# Patient Record
Sex: MALE | Race: WHITE | NOT HISPANIC OR LATINO | Employment: OTHER | URBAN - METROPOLITAN AREA
[De-identification: names, ages, dates, MRNs, and addresses within clinical notes are randomized per-mention and may not be internally consistent; named-entity substitution may affect disease eponyms.]

---

## 2018-07-20 ENCOUNTER — TELEPHONE (OUTPATIENT)
Dept: NEUROLOGY | Facility: CLINIC | Age: 68
End: 2018-07-20

## 2018-08-28 ENCOUNTER — OFFICE VISIT (OUTPATIENT)
Dept: NEUROLOGY | Facility: CLINIC | Age: 68
End: 2018-08-28
Payer: MEDICARE

## 2018-08-28 VITALS
HEIGHT: 71 IN | SYSTOLIC BLOOD PRESSURE: 108 MMHG | BODY MASS INDEX: 22.68 KG/M2 | WEIGHT: 162 LBS | DIASTOLIC BLOOD PRESSURE: 64 MMHG

## 2018-08-28 DIAGNOSIS — G20 PARKINSON'S DISEASE (HCC): Primary | ICD-10-CM

## 2018-08-28 PROBLEM — M47.12 OSTEOARTHRITIS OF CERVICAL SPINE WITH MYELOPATHY: Status: ACTIVE | Noted: 2018-08-28

## 2018-08-28 PROBLEM — K50.90 CROHN DISEASE (HCC): Status: ACTIVE | Noted: 2018-08-28

## 2018-08-28 PROBLEM — G20.A1 PARKINSON'S DISEASE (HCC): Status: ACTIVE | Noted: 2018-02-14

## 2018-08-28 PROCEDURE — 99205 OFFICE O/P NEW HI 60 MIN: CPT | Performed by: PSYCHIATRY & NEUROLOGY

## 2018-08-28 RX ORDER — TROSPIUM CHLORIDE ER 60 MG/1
60 CAPSULE ORAL
COMMUNITY

## 2018-08-28 RX ORDER — AMANTADINE HYDROCHLORIDE 100 MG/1
100 TABLET ORAL 2 TIMES DAILY
COMMUNITY
Start: 2018-02-14 | End: 2020-01-24

## 2018-08-28 RX ORDER — SULFASALAZINE 500 MG/1
500 TABLET ORAL DAILY
COMMUNITY

## 2018-08-28 RX ORDER — MERCAPTOPURINE 50 MG/1
50 TABLET ORAL DAILY
COMMUNITY

## 2018-08-28 RX ORDER — RANITIDINE 150 MG/1
150 TABLET ORAL AS NEEDED
COMMUNITY

## 2018-08-28 NOTE — ASSESSMENT & PLAN NOTE
Patient is a 80year-old left handed man who presents to establish care for his Parkinson's disease with symptom onset around 2013 (62yo) with right hand tremor and right shoulder pain  The patient's course is complicated by motor fluctuations with wearing off and possible dyskinesias  He found good tremor reduction with the addition of amantadine but is having some ankle swelling as a side effect       - Will decrease amantadine to 1 tab TID and monitor the ankle swelling   - Consider switching QHS sinemet to CR next time   - Pt with note worthy anxiety and depression (anhedonia ect) will try another SSRI in the future    -Would resume PT for gait and balance once able from a an insurance perspective

## 2018-08-28 NOTE — PATIENT INSTRUCTIONS
Try to decrease the amantadine to three times per day and pay attention to your leg swelling to see if the swelling gets better     @ 8am 11am 3:30    Continue with your sinemet 25/100 5 times per day at 8am, 11, 3:30, 7:30 and at bed time

## 2018-08-28 NOTE — LETTER
August 28, 2018     Faheem Oziel, [address] [place] [address]    Patient: Vera Gonzalez   YOB: 1950   Date of Visit: 8/28/2018       Dear Dr Rick Koyanagi Recipients: Thank you for referring Vera Gonzalez to me for evaluation  Below are my notes for this consultation  If you have questions, please do not hesitate to call me  I look forward to following your patient along with you  Sincerely,        Saul Narvaez MD        CC: No Recipients  Saul Narvaez MD  8/28/2018  5:58 PM  Sign at close encounter  Patient ID: Vera Gonzalez is a 80 y o  male  Assessment/Plan:    Parkinson's disease [facility])  Patient is a 80year-old left handed man who presents to establish care for his Parkinson's disease with symptom onset around 2013 (62yo) with right hand tremor and right shoulder pain  The patient's course is complicated by motor fluctuations with wearing off and possible dyskinesias  He found good tremor reduction with the addition of amantadine but is having some ankle swelling as a side effect  - Will decrease amantadine to 1 tab TID and monitor the ankle swelling   - Consider switching QHS sinemet to CR next time   - Pt with note worthy anxiety and depression (anhedonia ect) will try another SSRI in the future    -Would resume PT for gait and balance once able from a an insurance perspective       There are no diagnoses linked to this encounter  Total time spent today 60 minutes  Greater than 50% of total time was spent with the patient and / or family counseling and / or coordination of care    Subjective:    HPI    I had the pleasure of seeing your patient, Vera Gonzalez in the Movement Disorders Clinic at the [address] [place] [address],[address] for Neuroscience  As you know, Mr Ru Corcoran  is a 80y o  year old left handed male who presents to establish care for his Parkinson's disease  The patient reports that his symptoms began with right hand tremor and right shoulder pain around 2013  He has been evaluated by multiple neurologists in the past including most recently at the movement [address] [facility]  He is still following with a local, general neurologist as well  Previous medication trials:   - Sinemet 1 tab 5 times per day @ 8 11 3:30 7:30 QHS (current)  - Amantadine 1 tab 4 times per day  (ankle swelling) (current)   - Nupro patch: dizzy after one dose   - Mirapex: 2-3 doses caused leg swelling   - Azilect didn't want to try with his crohn's medications    Exposure to neuroleptics, pesticides, toxins, metals: No    Regarding motor symptoms:   Tremor: presenting symptom  Under decent control now  Variable  Slowness: very slow with everything  Stiffness: considerable on the right   Dystonia: right arm "locks" less often now  Changes in gait: slow  Balance issues   Falls: Yes, last week when on vacation  Once per week  PT helped  Freezing: not clear   Trouble with swallowing: no   Clumsiness/dexterity: yes     Regarding non-motor symptoms:   Anosmia: Yes  Constipation: yes  Urinary sx: frequency over night  Lightheadedness: sometimes  Changes in Mood: frustrated  No to much depression or anxiety  Trouble with sleep: poor  REM behavior Disorder: talk in your sleep  Memory trouble: No significant   Hallucinations: no     Regarding medication complication:   Wearing off: "I can tell when its time" lasts about 3 hours  Dyskinesias: Yes  Better with amantadine        Time since last dose: 2hr  Medications and timing:  - Sinemet 1 tab 5 times per day @ 8 11 3:30 7:30 QHS  - Amantadine 1 tab 4 times per day  (ankle swelling)      Objective:    Blood pressure 108/64, height 5' 11" (1 803 m), weight 73 5 kg (162 lb)  Physical Exam    Neurological Exam    GENERAL MEDICAL EXAMINATION:  General appearance: alert, in no apparent distress  Appropriately dressed and groomed  Conversing and interacting appropriately      HEENT: Sclera are non-injected  Mucous membranes are moist    CV: Heart rate is regular  Pulmonary: Breathing comfortably on RA   Musculoskeletal: No evidence of deformities  No contractures  No Edema  Skin: No visible rashes  Warm and well perfused  NEUROLOGICAL EXAMINATION:  Mental Status: Alert and oriented to person place and time  The patient has some difficulty recalling the details of his history, specifically regarding timing of prior medication trials and symptom onset  Attentive: able to name JUAN DAVID backward without difficulty  Language is fluent and appropriate with normal prosody  There were no paraphasic errors  Speech was mildly dysarthric  There was no pallilalia noted  Able to follow both midline and appendicular commands  Cranial Nerves:  II:  Visual fields full to finger counting  III-IV-VI: Full and conjugate, extra-ocular eye movements in all directions of gaze  No nystagmus or diplopia  Normal horizontal and vertical saccades (initiation and velocity)  Intact smooth pursuit  V: Symmetric perception of LT in V1-3  VII:   Some decreased activation of the left face  IX-X: Palate elevates symmetrically  XII: No tongue deviation or fasciculations    Motor: Overall  mildly reduced spontaneous movements  Normal muscle bulk throughout  Mild axial rigidity  Mild hypomimia was noted  Speech was not hypophonic  There were no dyskinesias or dystonia noted  No pronator drift or rebound  No asterixis or myoclonus noted      Rigidity - Upper Extremity (Right)  3   Rigdity - Upper Extremity (Left)   2   Rigidity - Lower Extremity (Right)  3   Rigidity - Lower Extremity (Left)   3   Finger Taps (Right)   3   Finger Taps (Left)   2   Hand Movement (Right)  3   Hand Movement (Left)   3   Pronation/Supination (Right)  2   Pronation/Supination (Left)   2   Toe Tapping (Right) 3   Toe Tapping (Left) 3   Leg Agility (Right)  2   Leg Agility (Left)   2   Postural Tremor (Right) 0   Postural Tremor (Left) 0   Kinetic Tremor (Right)  2cm   Kinetic Tremor (Left)  1cm   Rest tremor amplitude RUE 4cm   Rest tremor amplitude LUE 0   Rest tremor amplitude RLE 2cm   Reset tremor amplitude LLE 0   Lip/Jaw Tremor  0   Consistency of tremor >75%     Strength:    Delt Bi    Tri  WE  FE    FF          C5   C6   C7   C6   C7 C8/T1   R 5    5      5      5      5     5         L 5    5      5      5      5     5              IP   Quad [place]st  TA      L2   L3   L4-S1    L4    R 5     5      5          5     L 5     5     5           5       Reflexes:  2+ and symmetric throughout    Sensory: normal and symmetric perception of light touch and temperature  Coordination: Finger to nose without dysmetria bilaterally  No intention tremor  Gait: Able to rise from a chair without the use of arms - with some trouble  Posture was mild to moderately stooped  Narrow-base and stable stance  Normal initiation  Reduced stride length, step height and arm swing  The patient carries his right arm in a flexed posture  Turn completed in 4 steps  ROS:    Review of Systems   Constitutional: Positive for appetite change and unexpected weight change  Negative for fever  HENT: Positive for voice change  Negative for hearing loss, tinnitus and trouble swallowing  Eyes: Negative  Negative for photophobia and pain  Respiratory: Negative  Negative for shortness of breath  Cardiovascular: Negative  Negative for palpitations  Gastrointestinal: Negative  Negative for nausea and vomiting  Endocrine: Negative  Negative for cold intolerance and heat intolerance  Genitourinary: Positive for frequency and urgency  Negative for dysuria  Musculoskeletal: Negative  Negative for myalgias and neck pain  Skin: Negative  Negative for rash  Neurological: Positive for speech difficulty  Negative for dizziness, tremors, seizures, syncope, facial asymmetry, weakness, light-headedness, numbness and headaches          Clumsiness  Balance problems  Trouble falling asleep  Waking up at night  Facial numbness  Twitching    Hematological: Negative  Does not bruise/bleed easily  Psychiatric/Behavioral: Negative  Negative for confusion, hallucinations and sleep disturbance

## 2018-11-29 ENCOUNTER — OFFICE VISIT (OUTPATIENT)
Dept: NEUROLOGY | Facility: CLINIC | Age: 68
End: 2018-11-29
Payer: MEDICARE

## 2018-11-29 VITALS
SYSTOLIC BLOOD PRESSURE: 128 MMHG | WEIGHT: 165 LBS | DIASTOLIC BLOOD PRESSURE: 76 MMHG | BODY MASS INDEX: 23.1 KG/M2 | HEIGHT: 71 IN

## 2018-11-29 DIAGNOSIS — G20 PARKINSON'S DISEASE (HCC): Primary | ICD-10-CM

## 2018-11-29 PROCEDURE — 99213 OFFICE O/P EST LOW 20 MIN: CPT | Performed by: PSYCHIATRY & NEUROLOGY

## 2018-11-29 RX ORDER — CARBIDOPA AND LEVODOPA 25; 100 MG/1; MG/1
1 TABLET, EXTENDED RELEASE ORAL
Qty: 30 TABLET | Refills: 6 | Status: SHIPPED | OUTPATIENT
Start: 2018-11-29 | End: 2019-07-30 | Stop reason: SDUPTHER

## 2018-11-29 NOTE — ASSESSMENT & PLAN NOTE
The patient is a 21-year-old man with Parkinson's disease, symptom onset in 2013 with right hand tremor now complicated by motor fluctuations with early wearing off and possible dyskinesias  The patient has improved lower extremity swelling with the reduction in his amantadine but it is still bothersome to the patient  We will reduce his dose further to BID      - We will switch QHS Sinemet to CR for morning stiffness   - Reduce amantadine to BID monitor ankle swelling    In the future we could consider adding entacapone, reducing the dosing interval, trying Rytary or zonisamide

## 2018-11-29 NOTE — PROGRESS NOTES
Assessment/Plan:    Parkinson's disease [facility])  The patient is a 41-year-old man with Parkinson's disease, symptom onset in 2013 with right hand tremor now complicated by motor fluctuations with early wearing off and possible dyskinesias  The patient has improved lower extremity swelling with the reduction in his amantadine but it is still bothersome to the patient  We will reduce his dose further to BID  - We will switch QHS Sinemet to CR for morning stiffness   - Reduce amantadine to BID monitor ankle swelling    In the future we could consider adding entacapone, reducing the dosing interval, trying Rytary or zonisamide       Diagnoses and all orders for this visit:    Parkinson's disease ([address] )  -     carbidopa-levodopa (SINEMET CR)  mg TBCR per ER tablet; Take 1 tablet by mouth daily at bedtime        Subjective:     Patient ID: Pema Luna is a 80 y o  male  I had the pleasure of seeing your patient, Pema Luna in the Movement Disorders Clinic at the [facility] for Neuroscience  Pema Luna is a 80year-old left-handed man who presents in follow up for Parkinson's disease with symptom onset around 2013 (62yo) with right hand tremor and right shoulder pain  The patient's course is complicated by motor fluctuations with wearing off and possible dyskinesias  He found good tremor reduction with the addition of amantadine but is having some ankle swelling as a side effect  Swelling improved some with reduction of dose from QID to TID  Since the patient was last here he saw another orthopedic specialist who recommended shoulder replacement but also said he was not a good candidate for surgery, according to the patient  Today the patient reports that his tremor is overall fairly well controlled  It tends to come out when he is nervous  Tremor does emerge about a 0 5 hour before his next dose of medication is due    Since starting the amantadine patient feels that his sleep has been somewhat more disrupted  He also has been having periods of dry mouth  He reports that he is more stiff first thing in the morning  No falls  His balance does feel off occasionally  He is doing physical therapy and speech therapy locally  He continues to follow up with a local neurologist   Again wearing off about 30 minutes prior to his next dose  Previous medication trials:   - Nupro patch: dizzy after one dose   - Mirapex: 2-3 doses caused leg swelling   - Azilect didn't want to try with his crohn's medications    Medications and timing:  - Sinemet 1 tab 5 times per day @ 8 11 3:30 7:30 QHS  - Amantadine 100mg 1 tab 3 times per day  (ankle swelling)    The following portions of the patient's history were reviewed and updated as appropriate: allergies, current medications, past family history, past medical history, past social history, past surgical history and problem list     Objective:      /76 (BP Location: Left arm, Patient Position: Standing, Cuff Size: Standard)   Ht 5' 11" (1 803 m)   Wt 74 8 kg (165 lb)   BMI 23 01 kg/m²     Physical Exam    Neurological Exam  GENERAL MEDICAL EXAMINATION:  General appearance: alert, in no apparent distress  Appropriately dressed and groomed  Conversing and interacting appropriately  Eyes: Sclera are non-injected  Ears, nose, Mouth, Throat: Mucous membranes are moist    Resp: Breathing comfortably on RA   Musculoskeletal: No evidence of deformities  No contractures  No Edema  Skin: No visible rashes  Warm and well perfused    Psych: normal and appropriate affect                                Time since last dose:  4 hours     Speech  2    Facial Expression  3    Rigidity - Neck  1    Rigidity - Upper Extremity (Right)  2    Rigidity - Upper Extremity (Left)   2    Rigidity - Lower Extremity (Right)  2    Rigidity - Lower Extremity (Left)   2    Finger Taps (Right)   3    Finger Taps (Left)   2    Hand Movement (Right)  2    Hand Movement (Left)   2    Pronation/Supination (Right)  3    Pronation/Supination (Left)   2    Toe Tapping (Right) 3    Toe Tapping (Left) 2    Leg Agility (Right)  1    Leg Agility (Left)   2    Arising from Chair   1    Gait   2    Freezing of Gait 0    Postural Stability   x    Posture 3    Global spontaneity of movement 1    Postural Tremor (Right) 1    Postural Tremor (Left) 0    Kinetic Tremor (Right)  1    Kinetic Tremor (Left)  0    Rest tremor amplitude RUE 3    Rest tremor amplitude LUE 2    Rest tremor amplitude RLE 0    Reset tremor amplitude LLE 0    Lip/Jaw Tremor  0    Consistency of tremor 3    Motor Exam Total:        Some mild dystonic posturing of the right arm and leg while walking  Review of Systems   Constitutional: Negative  Negative for appetite change and fever  HENT: Positive for sore throat  Negative for hearing loss, tinnitus, trouble swallowing and voice change  Eyes: Negative  Negative for photophobia and pain  Respiratory: Negative  Negative for shortness of breath  Cardiovascular: Negative  Negative for palpitations  Gastrointestinal: Negative  Negative for nausea and vomiting  Endocrine: Negative  Negative for cold intolerance and heat intolerance  Genitourinary: Negative  Negative for dysuria, frequency and urgency  Musculoskeletal: Positive for gait problem  Negative for myalgias and neck pain  Skin: Negative  Negative for rash  Neurological: Positive for tremors (hands)  Negative for dizziness, seizures, syncope, facial asymmetry, speech difficulty, weakness, light-headedness, numbness and headaches  Hematological: Negative  Does not bruise/bleed easily  Psychiatric/Behavioral: Negative  Negative for confusion, hallucinations and sleep disturbance       Current Outpatient Prescriptions on File Prior to Visit   Medication Sig Dispense Refill    Amantadine HCl 100 MG tablet Take 100 mg by mouth 3 (three) times a day        carbidopa-levodopa (SINEMET)  mg per tablet Take 1 tablet by mouth 5 (five) times a day      mercaptopurine (PURINETHOL) 50 mg tablet Take 50 mg by mouth daily      ranitidine (ZANTAC) 150 mg tablet Take 150 mg by mouth daily      sulfaSALAzine (AZULFIDINE) 500 mg tablet Take 500 mg by mouth daily      trospium (SANCTURA XR) 60 mg 24 hr capsule Take 60 mg by mouth daily before breakfast       No current facility-administered medications on file prior to visit        Gino Gamez MD  Medical Director   [facility] Center  Movement and Memory Specialist

## 2018-11-29 NOTE — LETTER
November 29, 2018     Hina Story MD  [address]  [facility] [address]    Patient: Mandie Lantigua   YOB: 1950   Date of Visit: 11/29/2018       Dear Dr Rose Santana:    Thank you for referring Mandie Lantigua to me for evaluation  Below are my notes for this consultation  If you have questions, please do not hesitate to call me  I look forward to following your patient along with you  Sincerely,        Rubi Lugo MD        CC: DO Rubi Yuan MD  11/29/2018  6:07 PM  Sign at close encounter  Assessment/Plan:    Parkinson's disease [facility])  The patient is a 72-year-old man with Parkinson's disease, symptom onset in 2013 with right hand tremor now complicated by motor fluctuations with early wearing off and possible dyskinesias  The patient has improved lower extremity swelling with the reduction in his amantadine but it is still bothersome to the patient  We will reduce his dose further to BID  - We will switch QHS Sinemet to CR for morning stiffness   - Reduce amantadine to BID monitor ankle swelling    In the future we could consider adding entacapone, reducing the dosing interval, trying Rytary or zonisamide       Diagnoses and all orders for this visit:    Parkinson's disease ([address] )  -     carbidopa-levodopa (SINEMET CR)  mg TBCR per ER tablet; Take 1 tablet by mouth daily at bedtime        Subjective:     Patient ID: Mandie Lantigua is a 80 y o  male  I had the pleasure of seeing your patient, Mandie Lantigua in the Movement Disorders Clinic at the [facility] for Neuroscience  Mandie Lantigua is a 80year-old left-handed man who presents in follow up for Parkinson's disease with symptom onset around 2013 (64yo) with right hand tremor and right shoulder pain  The patient's course is complicated by motor fluctuations with wearing off and possible dyskinesias   He found good tremor reduction with the addition of amantadine but is having some ankle swelling as a side effect  Swelling improved some with reduction of dose from QID to TID  Since the patient was last here he saw another orthopedic specialist who recommended shoulder replacement but also said he was not a good candidate for surgery, according to the patient  Today the patient reports that his tremor is overall fairly well controlled  It tends to come out when he is nervous  Tremor does emerge about a 0 5 hour before his next dose of medication is due  Since starting the amantadine patient feels that his sleep has been somewhat more disrupted  He also has been having periods of dry mouth  He reports that he is more stiff first thing in the morning  No falls  His balance does feel off occasionally  He is doing physical therapy and speech therapy locally  He continues to follow up with a local neurologist   Again wearing off about 30 minutes prior to his next dose  Previous medication trials:   - Nupro patch: dizzy after one dose   - Mirapex: 2-3 doses caused leg swelling   - Azilect didn't want to try with his crohn's medications    Medications and timing:  - Sinemet 1 tab 5 times per day @ 8 11 3:30 7:30 QHS  - Amantadine 100mg 1 tab 3 times per day  (ankle swelling)    The following portions of the patient's history were reviewed and updated as appropriate: allergies, current medications, past family history, past medical history, past social history, past surgical history and problem list     Objective:      /76 (BP Location: Left arm, Patient Position: Standing, Cuff Size: Standard)   Ht 5' 11" (1 803 m)   Wt 74 8 kg (165 lb)   BMI 23 01 kg/m²      Physical Exam    Neurological Exam  GENERAL MEDICAL EXAMINATION:  General appearance: alert, in no apparent distress  Appropriately dressed and groomed  Conversing and interacting appropriately  Eyes: Sclera are non-injected     Ears, nose, Mouth, Throat: Mucous membranes are moist    Resp: Breathing comfortably on RA   Musculoskeletal: No evidence of deformities  No contractures  No Edema  Skin: No visible rashes  Warm and well perfused  Psych: normal and appropriate affect                                Time since last dose:  4 hours     Speech  2    Facial Expression  3    Rigidity - Neck  1    Rigidity - Upper Extremity (Right)  2    Rigidity - Upper Extremity (Left)   2    Rigidity - Lower Extremity (Right)  2    Rigidity - Lower Extremity (Left)   2    Finger Taps (Right)   3    Finger Taps (Left)   2    Hand Movement (Right)  2    Hand Movement (Left)   2    Pronation/Supination (Right)  3    Pronation/Supination (Left)   2    Toe Tapping (Right) 3    Toe Tapping (Left) 2    Leg Agility (Right)  1    Leg Agility (Left)   2    Arising from Chair   1    Gait   2    Freezing of Gait 0    Postural Stability   x    Posture 3    Global spontaneity of movement 1    Postural Tremor (Right) 1    Postural Tremor (Left) 0    Kinetic Tremor (Right)  1    Kinetic Tremor (Left)  0    Rest tremor amplitude RUE 3    Rest tremor amplitude LUE 2    Rest tremor amplitude RLE 0    Reset tremor amplitude LLE 0    Lip/Jaw Tremor  0    Consistency of tremor 3    Motor Exam Total:        Some mild dystonic posturing of the right arm and leg while walking  Review of Systems   Constitutional: Negative  Negative for appetite change and fever  HENT: Positive for sore throat  Negative for hearing loss, tinnitus, trouble swallowing and voice change  Eyes: Negative  Negative for photophobia and pain  Respiratory: Negative  Negative for shortness of breath  Cardiovascular: Negative  Negative for palpitations  Gastrointestinal: Negative  Negative for nausea and vomiting  Endocrine: Negative  Negative for cold intolerance and heat intolerance  Genitourinary: Negative  Negative for dysuria, frequency and urgency  Musculoskeletal: Positive for gait problem  Negative for myalgias and neck pain  Skin: Negative  Negative for rash  Neurological: Positive for tremors (hands)  Negative for dizziness, seizures, syncope, facial asymmetry, speech difficulty, weakness, light-headedness, numbness and headaches  Hematological: Negative  Does not bruise/bleed easily  Psychiatric/Behavioral: Negative  Negative for confusion, hallucinations and sleep disturbance  Current Outpatient Prescriptions on File Prior to Visit   Medication Sig Dispense Refill    Amantadine HCl 100 MG tablet Take 100 mg by mouth 3 (three) times a day        carbidopa-levodopa (SINEMET)  mg per tablet Take 1 tablet by mouth 5 (five) times a day      mercaptopurine (PURINETHOL) 50 mg tablet Take 50 mg by mouth daily      ranitidine (ZANTAC) 150 mg tablet Take 150 mg by mouth daily      sulfaSALAzine (AZULFIDINE) 500 mg tablet Take 500 mg by mouth daily      trospium (SANCTURA XR) 60 mg 24 hr capsule Take 60 mg by mouth daily before breakfast       No current facility-administered medications on file prior to visit        Olivia Tai MD  Medical Director   [facility] Center  Movement and Memory Specialist

## 2018-11-29 NOTE — PATIENT INSTRUCTIONS
Lets try going down on the amantadine to twice per day to see if it makes the swelling any better  At 8 and 3:30  - Sinemet (original) 1 tablet 4 times per day @ 8 11 3:30 7:30  - Sinemet CR (the serena medication) at bedtime     For constipation:  -Get regular exercise  -Drink lots of water  -Every day have at least one serving of fruit with high fiber foods:   Here are some options: applesauce, blueberries, activia yogurt,  bran (like All-Bran or Fiber-one) or prunes with oatmeal, bran or polenta  Consider adding 1-2 tablespoons of ground flax seed as well  - Remember that bananas are constipating  So are refined grains like white rice and white pasta (whole wheat pasta and brown rice ok)  -You can also try a probiotic, which you can find at natural food stores, online, or through foods  Foods with probiotics include yogurt and fermented foods like pickles, sauerkraut, kimchee and miso  There is also a juice called "Good Belly" and a drink called "kefir" with probiotics     - There are medications that can also be helpful:   Docusate sodium (stool softener), 100 mg, 1-3 tabs at night   Miralax once per day

## 2019-01-08 DIAGNOSIS — G20 PARKINSON'S DISEASE (HCC): Primary | ICD-10-CM

## 2019-01-09 NOTE — TELEPHONE ENCOUNTER
Received fax from [address] [place] [address]  asking to clarify if patient is to be taking Sinemet CR and IR  I called and spoke with pharmacist Ahmet Moreno and confirmed patient is to be taking both medications as prescribed  Ahmet Moreno verbalized understanding and states he will update patient record

## 2019-02-08 DIAGNOSIS — G20 PARKINSON'S DISEASE (HCC): ICD-10-CM

## 2019-02-08 NOTE — TELEPHONE ENCOUNTER
Patient called requesting refill for Sinemet  Orders entered, please approve if appropriate  Patient states PCP sent Sinemet CR refill but sent for BID  Patient states PCP had been filling until patient could come to our office  Patient states he notified Chiara Biswas to cancel the prescription from his PCP  I recommended that the patient reach out to PCP and ask that he leave any PD med refills to Dr Carnella Halsted moving forward to prevent any confusion  Patient verbalized clear understanding

## 2019-03-07 ENCOUNTER — OFFICE VISIT (OUTPATIENT)
Dept: NEUROLOGY | Facility: CLINIC | Age: 69
End: 2019-03-07
Payer: MEDICARE

## 2019-03-07 VITALS
HEIGHT: 71 IN | DIASTOLIC BLOOD PRESSURE: 84 MMHG | WEIGHT: 175 LBS | SYSTOLIC BLOOD PRESSURE: 126 MMHG | HEART RATE: 71 BPM | BODY MASS INDEX: 24.5 KG/M2

## 2019-03-07 DIAGNOSIS — R60.0 LOCALIZED EDEMA: ICD-10-CM

## 2019-03-07 DIAGNOSIS — G20 PARKINSON'S DISEASE (HCC): Primary | ICD-10-CM

## 2019-03-07 PROCEDURE — 99214 OFFICE O/P EST MOD 30 MIN: CPT | Performed by: PSYCHIATRY & NEUROLOGY

## 2019-03-07 RX ORDER — ZONISAMIDE 25 MG/1
50 CAPSULE ORAL DAILY
Qty: 60 CAPSULE | Refills: 3 | Status: SHIPPED | OUTPATIENT
Start: 2019-03-07 | End: 2019-09-06 | Stop reason: SDUPTHER

## 2019-03-07 RX ORDER — MELATONIN
1000 DAILY
COMMUNITY

## 2019-03-07 NOTE — ASSESSMENT & PLAN NOTE
61-year-old man with Parkinson's disease complicated by motor fluctuations with wearing off and dyskinesias  The patient's tremor and dyskinesia of become more obvious since the reduction in amantadine  His lower extremity edema is also completely resolved  The patient is still very bothered by his tremor  I am hesitant to go up on his Sinemet given the dyskinesias  We agreed to a trial of zonisamide to focus on the patient's tremor  If this is ineffective we could try acquiring a prior off for long-acting amantadine which may have less lower extremity edema than it is short-acting counterpart

## 2019-03-07 NOTE — PATIENT INSTRUCTIONS
Zonisamide: start with one capsule at bed time, increase to 2 capsules in 2 weeks if everything is going well

## 2019-03-07 NOTE — PROGRESS NOTES
Assessment/Plan:    Parkinson's disease [facility])  50-year-old man with Parkinson's disease complicated by motor fluctuations with wearing off and dyskinesias  The patient's tremor and dyskinesia of become more obvious since the reduction in amantadine  His lower extremity edema is also completely resolved  The patient is still very bothered by his tremor  I am hesitant to go up on his Sinemet given the dyskinesias  We agreed to a trial of zonisamide to focus on the patient's tremor  If this is ineffective we could try acquiring a prior off for long-acting amantadine which may have less lower extremity edema than it is short-acting counterpart  Diagnoses and all orders for this visit:    Parkinson's disease [facility])  -     Ambulatory referral to Physical Therapy; Future  -     zonisamide (ZONEGRAN) 25 mg capsule; Take 2 capsules (50 mg total) by mouth daily Start with one cap at bed time then increase to 2 in two weeks  Localized edema    Other orders  -     Multiple Vitamin (MULTI-VITAMIN) tablet; Take by mouth  -     cholecalciferol (VITAMIN D3) 1,000 units tablet; Take 1,000 Units by mouth daily  -     POTASSIUM PO; Take by mouth        Subjective:     Patient ID: Anupama Yan is a 80 y o  male  I had the pleasure of seeing your patient, Anupama Yan in the Movement Disorders Clinic at the [address] [place] [address],[address] for Neuroscience  Anupama Yan is a 80year-old left-handed man who presents in follow up for Parkinson's disease with symptom onset around 2013 (64yo) with right hand tremor and right shoulder pain  The patient's course is complicated by motor fluctuations with wearing off and dyskinesias; he also has considerable right shoulder pain  He found good tremor reduction with the addition of amantadine but this resulted in significant ankle swelling as a side effect  Swelling improved some with reduction of dose from QID (started by Dr Margarito Maldonado) to TID and resolved when reduced to BID       When he was last here we reduced his amantadine further, to BID due to continued bothersome ankle swelling  We switched his QHS sinemet dose to CR for morning stiffness  Previous medication trials:   - Nupro patch: dizzy after one dose   - Mirapex: 2-3 doses caused leg swelling   - Azilect didn't want to try with his crohn's medications     Current medications and timing:  - Sinemet 25/100; 1 tab; 4 times per day @ 8 11 3:30 7:30   - Sinemet 25/100 CR; 1 tab; QHS  - Amantadine 100mg, 1 tab, BID  (ankle swelling, dry mouth)    Interval history:  Right shoulder is still bothering him a lot  More stiffness, slowness and tremor but ankle swelling is better  Very variable  Some days he reports having almost no symptoms in other days his tremor is quite severe  He is still quite bothered and distressed by the tremor  Regarding motor symptoms:   Tremor: more on the right still  Slowness: worse than prior  Stiffness: worse than prior - since amantadine reduction   Changes in gait: "I think I walk well" issues with his right shoulder  Falls: no        Freezing: yes, mostly if seated for a while  Trouble with swallowing:     Regarding non-motor symptoms:   Lightheadedness: no   Mood: board mostly   Driving issues: wife drives mostly  Regarding medication complications:  Wearing off: 30 min prior to a dose, takes 30 min to kick in     Dyskinesias:  Patient denies this, visible on exam       The following portions of the patient's history were reviewed and updated as appropriate: allergies, current medications, past family history, past medical history, past social history, past surgical history and problem list     Objective:    /84 (BP Location: Left arm, Patient Position: Sitting, Cuff Size: Standard)   Pulse 71   Ht 5' 11" (1 803 m)   Wt 79 4 kg (175 lb)   BMI 24 41 kg/m²       Physical Exam    Neurological Exam    UPDRS motor:                              Time since last dose:       Speech  2 Facial Expression  2     Rigidity - Neck  0     Rigidity - Upper Extremity (Right)  1     Rigidity - Upper Extremity (Left)   1     Rigidity - Lower Extremity (Right)  0     Rigidity - Lower Extremity (Left)   0     Finger Taps (Right)   3     Finger Taps (Left)   2     Hand Movement (Right)  3     Hand Movement (Left)   2     Pronation/Supination (Right)       Pronation/Supination (Left)        Toe Tapping (Right) 3     Toe Tapping (Left) 1     Leg Agility (Right)  2     Leg Agility (Left)   1     Arising from Chair   1     Gait   1     Freezing of Gait 0     Postural Stability        Posture 3     Global spontaneity of movement 1     Postural Tremor (Right) 1     Postural Tremor (Left) 0     Kinetic Tremor (Right)  1     Kinetic Tremor (Left)  0     Rest tremor amplitude RUE 3     Rest tremor amplitude LUE 2     Rest tremor amplitude RLE 0     Reset tremor amplitude LLE 0     Lip/Jaw Tremor  0     Consistency of tremor 2     Motor Exam Total:          Dyskinesia more on the right  Some on the trunk  Some dystonic posturing in the right arm when walking  Review of Systems   Constitutional: Negative  Negative for appetite change and fever  HENT: Negative  Negative for hearing loss, tinnitus, trouble swallowing and voice change  Eyes: Negative  Negative for photophobia and pain  Respiratory: Negative  Negative for shortness of breath  Cardiovascular: Negative  Negative for palpitations  Gastrointestinal: Negative  Negative for nausea and vomiting  Endocrine: Negative  Negative for cold intolerance and heat intolerance  Genitourinary: Negative  Negative for dysuria, frequency and urgency  Musculoskeletal: Negative  Negative for myalgias and neck pain  Feet feel heavy   Skin: Negative  Negative for rash  Neurological: Positive for tremors  Negative for dizziness, seizures, syncope, facial asymmetry, speech difficulty, weakness, light-headedness, numbness and headaches  Hematological: Negative  Does not bruise/bleed easily  Psychiatric/Behavioral: Positive for sleep disturbance  Negative for confusion and hallucinations  The above ROS was reviewed and updated  Eliseo Rae MD  Medical Director   [facility]  Movement and Memory Specialist       Current Outpatient Medications on File Prior to Visit   Medication Sig Dispense Refill    Amantadine HCl 100 MG tablet Take 100 mg by mouth 2 (two) times a day       carbidopa-levodopa (SINEMET CR)  mg TBCR per ER tablet Take 1 tablet by mouth daily at bedtime 30 tablet 6    carbidopa-levodopa (SINEMET)  mg per tablet Take 1 tablet by mouth 4 (four) times a day 360 tablet 0    cholecalciferol (VITAMIN D3) 1,000 units tablet Take 1,000 Units by mouth daily      mercaptopurine (PURINETHOL) 50 mg tablet Take 50 mg by mouth daily      Multiple Vitamin (MULTI-VITAMIN) tablet Take by mouth      POTASSIUM PO Take by mouth      ranitidine (ZANTAC) 150 mg tablet Take 150 mg by mouth daily      sulfaSALAzine (AZULFIDINE) 500 mg tablet Take 500 mg by mouth daily      trospium (SANCTURA XR) 60 mg 24 hr capsule Take 60 mg by mouth daily before breakfast       No current facility-administered medications on file prior to visit

## 2019-03-07 NOTE — LETTER
March 7, 2019     Ebonie Gale, [address] [facility] [place] [id number]    Patient: Ghada Ennis   YOB: 1950   Date of Visit: 3/7/2019       Dear Dr Angelo Keepers: Thank you for referring Ghada Ennis to me for evaluation  Below are my notes for this consultation  If you have questions, please do not hesitate to call me  I look forward to following your patient along with you  Sincerely,        Arpita Ta MD        CC: No Recipients  Arpita Ta MD  3/7/2019  5:54 PM  Sign at close encounter  Assessment/Plan:    Parkinson's disease [facility])  26-year-old man with Parkinson's disease complicated by motor fluctuations with wearing off and dyskinesias  The patient's tremor and dyskinesia of become more obvious since the reduction in amantadine  His lower extremity edema is also completely resolved  The patient is still very bothered by his tremor  I am hesitant to go up on his Sinemet given the dyskinesias  We agreed to a trial of zonisamide to focus on the patient's tremor  If this is ineffective we could try acquiring a prior off for long-acting amantadine which may have less lower extremity edema than it is short-acting counterpart  Diagnoses and all orders for this visit:    Parkinson's disease [facility])  -     Ambulatory referral to Physical Therapy; Future  -     zonisamide (ZONEGRAN) 25 mg capsule; Take 2 capsules (50 mg total) by mouth daily Start with one cap at bed time then increase to 2 in two weeks  Localized edema    Other orders  -     Multiple Vitamin (MULTI-VITAMIN) tablet; Take by mouth  -     cholecalciferol (VITAMIN D3) 1,000 units tablet; Take 1,000 Units by mouth daily  -     POTASSIUM PO; Take by mouth        Subjective:     Patient ID: Ghada Ennis is a 80 y o  male  I had the pleasure of seeing your patient, Ghada Ennis in the Movement Disorders Clinic at the [facility] for Neuroscience        Ghada Ennis is a 80year-old left-handed man who presents in follow up for Parkinson's disease with symptom onset around 2013 (64yo) with right hand tremor and right shoulder pain  The patient's course is complicated by motor fluctuations with wearing off and dyskinesias; he also has considerable right shoulder pain  He found good tremor reduction with the addition of amantadine but this resulted in significant ankle swelling as a side effect  Swelling improved some with reduction of dose from QID (started by Dr Chinmay Vera) to TID and resolved when reduced to BID  When he was last here we reduced his amantadine further, to BID due to continued bothersome ankle swelling  We switched his QHS sinemet dose to CR for morning stiffness  Previous medication trials:   - Nupro patch: dizzy after one dose   - Mirapex: 2-3 doses caused leg swelling   - Azilect didn't want to try with his crohn's medications     Current medications and timing:  - Sinemet 25/100; 1 tab; 4 times per day @ 8 11 3:30 7:30   - Sinemet 25/100 CR; 1 tab; QHS  - Amantadine 100mg, 1 tab, BID  (ankle swelling, dry mouth)    Interval history:  Right shoulder is still bothering him a lot  More stiffness, slowness and tremor but ankle swelling is better  Very variable  Some days he reports having almost no symptoms in other days his tremor is quite severe  He is still quite bothered and distressed by the tremor  Regarding motor symptoms:   Tremor: more on the right still  Slowness: worse than prior  Stiffness: worse than prior - since amantadine reduction   Changes in gait: "I think I walk well" issues with his right shoulder  Falls: no        Freezing: yes, mostly if seated for a while  Trouble with swallowing:     Regarding non-motor symptoms:   Lightheadedness: no   Mood: board mostly   Driving issues: wife drives mostly  Regarding medication complications:  Wearing off: 30 min prior to a dose, takes 30 min to kick in     Dyskinesias:  Patient denies this, visible on exam       The following portions of the patient's history were reviewed and updated as appropriate: allergies, current medications, past family history, past medical history, past social history, past surgical history and problem list     Objective:    /84 (BP Location: Left arm, Patient Position: Sitting, Cuff Size: Standard)   Pulse 71   Ht 5' 11" (1 803 m)   Wt 79 4 kg (175 lb)   BMI 24 41 kg/m²        Physical Exam    Neurological Exam    UPDRS motor:                              Time since last dose:       Speech  2     Facial Expression  2     Rigidity - Neck  0     Rigidity - Upper Extremity (Right)  1     Rigidity - Upper Extremity (Left)   1     Rigidity - Lower Extremity (Right)  0     Rigidity - Lower Extremity (Left)   0     Finger Taps (Right)   3     Finger Taps (Left)   2     Hand Movement (Right)  3     Hand Movement (Left)   2     Pronation/Supination (Right)       Pronation/Supination (Left)        Toe Tapping (Right) 3     Toe Tapping (Left) 1     Leg Agility (Right)  2     Leg Agility (Left)   1     Arising from Chair   1     Gait   1     Freezing of Gait 0     Postural Stability        Posture 3     Global spontaneity of movement 1     Postural Tremor (Right) 1     Postural Tremor (Left) 0     Kinetic Tremor (Right)  1     Kinetic Tremor (Left)  0     Rest tremor amplitude RUE 3     Rest tremor amplitude LUE 2     Rest tremor amplitude RLE 0     Reset tremor amplitude LLE 0     Lip/Jaw Tremor  0     Consistency of tremor 2     Motor Exam Total:          Dyskinesia more on the right  Some on the trunk  Some dystonic posturing in the right arm when walking  Review of Systems   Constitutional: Negative  Negative for appetite change and fever  HENT: Negative  Negative for hearing loss, tinnitus, trouble swallowing and voice change  Eyes: Negative  Negative for photophobia and pain  Respiratory: Negative  Negative for shortness of breath  Cardiovascular: Negative  Negative for palpitations  Gastrointestinal: Negative  Negative for nausea and vomiting  Endocrine: Negative  Negative for cold intolerance and heat intolerance  Genitourinary: Negative  Negative for dysuria, frequency and urgency  Musculoskeletal: Negative  Negative for myalgias and neck pain  Feet feel heavy   Skin: Negative  Negative for rash  Neurological: Positive for tremors  Negative for dizziness, seizures, syncope, facial asymmetry, speech difficulty, weakness, light-headedness, numbness and headaches  Hematological: Negative  Does not bruise/bleed easily  Psychiatric/Behavioral: Positive for sleep disturbance  Negative for confusion and hallucinations  The above ROS was reviewed and updated  Eileen Pride MD  Medical Director   [facility]  Movement and Memory Specialist       Current Outpatient Medications on File Prior to Visit   Medication Sig Dispense Refill    Amantadine HCl 100 MG tablet Take 100 mg by mouth 2 (two) times a day       carbidopa-levodopa (SINEMET CR)  mg TBCR per ER tablet Take 1 tablet by mouth daily at bedtime 30 tablet 6    carbidopa-levodopa (SINEMET)  mg per tablet Take 1 tablet by mouth 4 (four) times a day 360 tablet 0    cholecalciferol (VITAMIN D3) 1,000 units tablet Take 1,000 Units by mouth daily      mercaptopurine (PURINETHOL) 50 mg tablet Take 50 mg by mouth daily      Multiple Vitamin (MULTI-VITAMIN) tablet Take by mouth      POTASSIUM PO Take by mouth      ranitidine (ZANTAC) 150 mg tablet Take 150 mg by mouth daily      sulfaSALAzine (AZULFIDINE) 500 mg tablet Take 500 mg by mouth daily      trospium (SANCTURA XR) 60 mg 24 hr capsule Take 60 mg by mouth daily before breakfast       No current facility-administered medications on file prior to visit

## 2019-03-28 ENCOUNTER — TELEPHONE (OUTPATIENT)
Dept: NEUROLOGY | Facility: CLINIC | Age: 69
End: 2019-03-28

## 2019-03-28 NOTE — TELEPHONE ENCOUNTER
Pt calls in to state that he believes the pharmacy made a mistake and gave him 50mg tabs insead of 25mg of zonisamide  I did verify with Kvng Keen from [address] [place] [address] that he was given 50mg by mistake  Pt has been taking 1 50mg capsule and noticed the mistake before he would have increased to 2 caps  Are you okay with pt continuing to take 1 50mg tab as this was the goal? Please advise       [phone]

## 2019-04-04 NOTE — TELEPHONE ENCOUNTER
Kinza Aguilar from [facility] called to clarify if pt is ok staying on 50 mg  Daron Whalen of the below and verbalized understanding

## 2019-04-15 DIAGNOSIS — G20 PARKINSON'S DISEASE (HCC): ICD-10-CM

## 2019-06-13 ENCOUNTER — OFFICE VISIT (OUTPATIENT)
Dept: NEUROLOGY | Facility: CLINIC | Age: 69
End: 2019-06-13
Payer: MEDICARE

## 2019-06-13 VITALS
HEART RATE: 80 BPM | WEIGHT: 173 LBS | HEIGHT: 71 IN | DIASTOLIC BLOOD PRESSURE: 86 MMHG | BODY MASS INDEX: 24.22 KG/M2 | SYSTOLIC BLOOD PRESSURE: 142 MMHG

## 2019-06-13 DIAGNOSIS — G20 PARKINSON'S DISEASE (HCC): Primary | ICD-10-CM

## 2019-06-13 PROCEDURE — 99214 OFFICE O/P EST MOD 30 MIN: CPT | Performed by: PSYCHIATRY & NEUROLOGY

## 2019-07-30 DIAGNOSIS — G20 PARKINSON'S DISEASE (HCC): ICD-10-CM

## 2019-07-30 RX ORDER — CARBIDOPA AND LEVODOPA 25; 100 MG/1; MG/1
1 TABLET, EXTENDED RELEASE ORAL
Qty: 30 TABLET | Refills: 6 | Status: SHIPPED | OUTPATIENT
Start: 2019-07-30 | End: 2020-02-24 | Stop reason: SDUPTHER

## 2019-09-06 DIAGNOSIS — G20 PARKINSON'S DISEASE (HCC): ICD-10-CM

## 2019-09-06 RX ORDER — ZONISAMIDE 50 MG/1
50 CAPSULE ORAL DAILY
Qty: 30 CAPSULE | Refills: 2 | Status: SHIPPED | OUTPATIENT
Start: 2019-09-06 | End: 2019-09-17 | Stop reason: SDUPTHER

## 2019-09-06 NOTE — TELEPHONE ENCOUNTER
Patient requesting refill of Zonisamide  States he takes 1-50mg capsule at bedtime daily  He reports he is out of medication

## 2019-09-17 ENCOUNTER — OFFICE VISIT (OUTPATIENT)
Dept: NEUROLOGY | Facility: CLINIC | Age: 69
End: 2019-09-17
Payer: MEDICARE

## 2019-09-17 VITALS
BODY MASS INDEX: 23.66 KG/M2 | HEIGHT: 71 IN | HEART RATE: 78 BPM | WEIGHT: 169 LBS | DIASTOLIC BLOOD PRESSURE: 60 MMHG | SYSTOLIC BLOOD PRESSURE: 120 MMHG

## 2019-09-17 DIAGNOSIS — G20 PARKINSON'S DISEASE (HCC): ICD-10-CM

## 2019-09-17 PROCEDURE — 99214 OFFICE O/P EST MOD 30 MIN: CPT | Performed by: PSYCHIATRY & NEUROLOGY

## 2019-09-17 RX ORDER — ZONISAMIDE 100 MG/1
100 CAPSULE ORAL DAILY
Qty: 30 CAPSULE | Refills: 3 | Status: SHIPPED | OUTPATIENT
Start: 2019-09-17 | End: 2020-01-19 | Stop reason: SDUPTHER

## 2019-09-17 NOTE — ASSESSMENT & PLAN NOTE
80-year-old man with Parkinson's disease complicated by a mild motor fluctuations  His symptoms have progressed slightly with reports of increased tremor in his feet  He is hesitant to make any major medication adjustments so we agreed to increase his zonisamide to 100 mg at bedtime, from 50  - We discussed the long-acting amantadine which she is interested in potentially trying in the future    - We could also try Imbrija or entacapone

## 2019-09-17 NOTE — PROGRESS NOTES
Assessment/Plan:    Parkinson's disease [facility])  62-year-old man with Parkinson's disease complicated by a mild motor fluctuations  His symptoms have progressed slightly with reports of increased tremor in his feet  He is hesitant to make any major medication adjustments so we agreed to increase his zonisamide to 100 mg at bedtime, from 50  - We discussed the long-acting amantadine which she is interested in potentially trying in the future  - We could also try Imbrija or entacapone        Diagnoses and all orders for this visit:    Parkinson's disease ([address] )  -     zonisamide (ZONEGRAN) 100 mg capsule; Take 1 capsule (100 mg total) by mouth daily        Subjective:     Patient ID: Carmita Johnson is a 77 y o  male  I had the pleasure of seeing your patient, Carmita Johnson in the Movement Disorders Clinic at the [address] [place] [address],[address] for Neuroscience  Carmita Johnson is a 77year-old left-handed man who presents in follow up for Parkinson's disease with symptom onset around 2013 (62yo) with right hand tremor and right shoulder pain  The patient's course is complicated by motor fluctuations with wearing off and dyskinesias; he also has considerable right shoulder pain  He found good tremor reduction with the addition of amantadine but this resulted in significant ankle swelling as a side effect   Swelling improved some with reduction of dose from QID (started by Dr Deondre Karimi) to TID and resolved when reduced to BID       Current medications and timing:  - Sinemet 25/100; 1 tab; 4 times per day @ 8 11 3:30 7:30   - Sinemet 25/100 CR; 1 tab; QHS  - Amantadine 100mg, 1 tab, BID 8a 3:30p (ankle swelling, dry mouth)  - zonisamide 50mg QHS     Previous medication trials:   - Nupro patch: dizzy after one dose   - Mirapex: 2-3 doses caused leg swelling   - Azilect didn't want to try with his crohn's medications       Interval history:  Speech feels like its getting worse  Some foot tremor now  Tremor overall is better with zonisamide   Feet feel heavy when seated for a while  Gait is no problem  Frequent urination is still an issue   Drool when sleeping wakes him up  Trouble falling asleep due to anxiety  Denies walking trouble  PT was great but his insurance ran out     Driving issues: hasn't been driving   POA:  Not filed       Regarding medication complications:  Wearing off: 30 min early and takes 30 min to kicks in   Dyskinesias: not bothersome       The following portions of the patient's history were reviewed and updated as appropriate: allergies, current medications, past family history, past medical history, past social history, past surgical history and problem list       Objective:  /60 (BP Location: Right arm, Patient Position: Standing, Cuff Size: Standard)   Pulse 78   Ht 5' 11" (1 803 m)   Wt 76 7 kg (169 lb)   BMI 23 57 kg/m²     Physical Exam    Neurological Exam     UPDRS motor:                              Time since last dose:       Speech  2     Facial Expression  2     Rigidity - Neck  0     Rigidity - Upper Extremity (Right)  2     Rigidity - Upper Extremity (Left)   2     Rigidity - Lower Extremity (Right)  1     Rigidity - Lower Extremity (Left)   1     Finger Taps (Right)   2     Finger Taps (Left)   2     Hand Movement (Right)  2     Hand Movement (Left)   2     Pronation/Supination (Right)  2     Pronation/Supination (Left)   2     Toe Tapping (Right) 3     Toe Tapping (Left) 2     Leg Agility (Right)  2     Leg Agility (Left)   2     Arising from Chair   2     Gait   1     Freezing of Gait 0     Postural Stability        Posture 1     Global spontaneity of movement 1     Postural Tremor (Right) 0     Postural Tremor (Left) 0     Kinetic Tremor (Right)  1     Kinetic Tremor (Left)  0     Rest tremor amplitude RUE 3     Rest tremor amplitude LUE 2     Rest tremor amplitude RLE 0     Reset tremor amplitude LLE 0     Lip/Jaw Tremor  2     Consistency of tremor 3     Motor Exam Total: Review of Systems   Constitutional: Negative  Negative for appetite change and fever  HENT: Positive for hearing loss  Negative for tinnitus, trouble swallowing and voice change  Eyes: Negative  Negative for photophobia and pain  Respiratory: Negative  Negative for shortness of breath  Cardiovascular: Negative  Negative for palpitations  Gastrointestinal: Negative  Negative for nausea and vomiting  Endocrine: Negative  Negative for cold intolerance and heat intolerance  Genitourinary: Negative  Negative for dysuria, frequency and urgency  Musculoskeletal: Positive for gait problem  Negative for myalgias and neck pain  Shoulder pain in the right arm   Skin: Negative  Negative for rash  Neurological: Positive for tremors (after taking meds but getting better) and speech difficulty (slurred)  Negative for dizziness, seizures, syncope, facial asymmetry, weakness, light-headedness, numbness and headaches  Twitching in the feet   Hematological: Negative  Does not bruise/bleed easily  Psychiatric/Behavioral: Positive for sleep disturbance (drooling)  Negative for confusion and hallucinations  The patient is nervous/anxious  The above ROS was reviewed and updated       Hakan Kemp MD  Medical Director   [facility]  Movement and Memory Specialist       Current Outpatient Medications on File Prior to Visit   Medication Sig Dispense Refill    Amantadine HCl 100 MG tablet Take 100 mg by mouth 2 (two) times a day       carbidopa-levodopa (SINEMET CR)  mg TBCR per ER tablet Take 1 tablet by mouth daily at bedtime 30 tablet 6    carbidopa-levodopa (SINEMET)  mg per tablet Take 1 tablet by mouth 4 (four) times a day 360 tablet 5    mercaptopurine (PURINETHOL) 50 mg tablet Take 50 mg by mouth daily      Multiple Vitamin (MULTI-VITAMIN) tablet Take by mouth      ranitidine (ZANTAC) 150 mg tablet Take 150 mg by mouth as needed       sulfaSALAzine (AZULFIDINE) 500 mg tablet Take 500 mg by mouth daily 2 tabs 3 times daily      trospium (SANCTURA XR) 60 mg 24 hr capsule Take 60 mg by mouth daily before breakfast      [DISCONTINUED] zonisamide (ZONEGRAN) 50 MG capsule Take 1 capsule (50 mg total) by mouth daily 30 capsule 2    cholecalciferol (VITAMIN D3) 1,000 units tablet Take 1,000 Units by mouth daily      POTASSIUM PO Take by mouth       No current facility-administered medications on file prior to visit

## 2019-11-07 ENCOUNTER — TELEPHONE (OUTPATIENT)
Dept: NEUROLOGY | Facility: CLINIC | Age: 69
End: 2019-11-07

## 2019-11-07 NOTE — TELEPHONE ENCOUNTER
[facility] for patient to return my call about medication refill  Gave my direct line in [place]

## 2020-01-17 DIAGNOSIS — G20 PARKINSON'S DISEASE (HCC): Primary | ICD-10-CM

## 2020-01-17 NOTE — TELEPHONE ENCOUNTER
Medication refill check list    Correct patient? yes   Correct medication name, dose, and pill size? yes   Correct provider? yes   Last and Next appt  scheduled? Yes, last date 9/17/19 & next date 1/24/20   Right pharmacy listed? yes   Correct quantity for 30 or 90 days? yes   Is the patient out of refills? When was it last prescribed? Yes, last date 9/17/19   Directions match what the patient says they are taking?  yes   Enough refills? (none for controlled substances, 1 year for routine medications) no

## 2020-01-19 RX ORDER — ZONISAMIDE 100 MG/1
100 CAPSULE ORAL DAILY
Qty: 30 CAPSULE | Refills: 3 | Status: SHIPPED | OUTPATIENT
Start: 2020-01-19 | End: 2020-04-21 | Stop reason: SDUPTHER

## 2020-01-24 ENCOUNTER — OFFICE VISIT (OUTPATIENT)
Dept: NEUROLOGY | Facility: CLINIC | Age: 70
End: 2020-01-24
Payer: MEDICARE

## 2020-01-24 VITALS
WEIGHT: 170 LBS | DIASTOLIC BLOOD PRESSURE: 84 MMHG | HEIGHT: 71 IN | HEART RATE: 79 BPM | SYSTOLIC BLOOD PRESSURE: 122 MMHG | BODY MASS INDEX: 23.8 KG/M2

## 2020-01-24 DIAGNOSIS — G20 PARKINSON'S DISEASE (HCC): Primary | ICD-10-CM

## 2020-01-24 PROCEDURE — 99214 OFFICE O/P EST MOD 30 MIN: CPT | Performed by: PSYCHIATRY & NEUROLOGY

## 2020-01-24 RX ORDER — AMANTADINE HYDROCHLORIDE 100 MG/1
100 CAPSULE, GELATIN COATED ORAL 2 TIMES DAILY
Qty: 180 CAPSULE | Refills: 3 | Status: SHIPPED | OUTPATIENT
Start: 2020-01-24 | End: 2020-11-30

## 2020-01-24 NOTE — PATIENT INSTRUCTIONS
Parkinson's Disease Resources     Helpful web sites   -  www ChangePanda  org   -  www parkinson  org (the [employer])   -  www [employer] ([address] for [place]) - Order the "Every Victory Counts" cheyanne under the "resources" tab  Or visit [employer] or call 122-202-7714   - Contact the [facility] for an "Aware in care kit" @ 7514.354.2494 (this is a kit to take with you if you ever have to go to the hospital)    - check out "The [address] [facility]" for help paying for your medications: www tafcares  org

## 2020-01-24 NOTE — PROGRESS NOTES
Assessment/Plan:    Parkinson's disease ([facility] )  77year-old man with Parksinson's disease presents in followup  Increase in zonisamide resulted in resolution of his LE tremors  Overall he is stable from prior  We discussed different options regarding medication management and agreed not to make any changes at this point  In the future we can try increasing his sinemet dose, long-acting amantadine, Nourianz,       Diagnoses and all orders for this visit:    Parkinson's disease ([facility] )  -     amantadine (SYMMETREL) 100 mg capsule; Take 1 capsule (100 mg total) by mouth 2 (two) times a day  -     Ambulatory referral to Physical Therapy; Future        Subjective:     Patient ID: Mindi Mak is a 77 y o  male  I had the pleasure of seeing your patient, Mindi Mak in the Movement Disorders Clinic at the [facility] for Neuroscience  Mindi Mak is a 77year-old left-handed man who presents in follow up for Parkinson's disease with symptom onset around 2013 (62yo) with right hand tremor and right shoulder pain  The patient's course is complicated by motor fluctuations with wearing off and dyskinesias; he also has considerable right shoulder pain  He found good tremor reduction with the addition of amantadine but this resulted in significant ankle swelling as a side effect  Swelling improved some with reduction of dose from QID (started by Dr Jose Jin) to TID and resolved when reduced to BID       Current medications and timing:  - Sinemet 25/100; 1 tab; 4 times per day @ 8 11 3:30 7:30   - Sinemet 25/100 CR; 1 tab; QHS  - Amantadine 100mg, 1 tab, BID 8a 3:30p (ankle swelling, dry mouth)  - zonisamide 100mg QHS   - MMJ     Previous medication trials:   - Nupro patch: dizzy after one dose   - Mirapex: 2-3 doses caused leg swelling   - Azilect didn't want to try with his crohn's medications    Interval history:  Feels good  Tremor is about the same  No return of LE edema     Amantadine was switched to a tab which causes some GI symptoms - burning  This is new  Sleeping better with medical cannabis   Mood isn't great  Dependent on his wife to drive  He feels really board at home  No gait issues or falls  Zonisamide seemed to help more when going up on the dose  LE tremor is completely resolved  No falls  The following portions of the patient's history were reviewed and updated as appropriate: allergies, current medications, past family history, past medical history, past social history, past surgical history and problem list       Objective:  /84 (BP Location: Left arm, Patient Position: Sitting, Cuff Size: Standard)   Pulse 79   Ht 5' 11" (1 803 m)   Wt 77 1 kg (170 lb)   BMI 23 71 kg/m²     Physical Exam    Neurological Exam    GENERAL MEDICAL EXAMINATION:  General appearance: alert, in no apparent distress  Appropriately dressed and groomed  Conversing and interacting appropriately  Eyes: Sclera are non-injected  Ears, nose, Mouth, Throat: Mucous membranes are moist    Resp: Breathing comfortably on RA   Musculoskeletal: No evidence of deformities  No contractures  No Edema  Skin: No visible rashes  Warm and well perfused  Psych: normal and appropriate affect     Mental Status:  Alert and oriented to person place and time  Able to relate history without difficulty  Attentive to conversation  Language is fluent and appropriate with normal prosody  There were no paraphasic errors  Speech was not dysarthric  Able to follow both midline and appendicular commands       General Neurology examination:       UPDRS motor:                              Time since last dose:       Speech  2     Facial Expression  2     Rigidity - Neck  0     Rigidity - Upper Extremity (Right)  2     Rigidity - Upper Extremity (Left)   1     Rigidity - Lower Extremity (Right)  2     Rigidity - Lower Extremity (Left)   2     Finger Taps (Right)   2     Finger Taps (Left)   2     Hand Movement (Right)  3     Hand Movement (Left)   2     Pronation/Supination (Right)  2     Pronation/Supination (Left)   2     Toe Tapping (Right) 3     Toe Tapping (Left) 1     Leg Agility (Right)  1     Leg Agility (Left)   1     Arising from Chair   1     Gait   2     Freezing of Gait 0     Postural Stability        Posture 3     Global spontaneity of movement 1     Postural Tremor (Right) 2     Postural Tremor (Left) 0     Kinetic Tremor (Right)  1     Kinetic Tremor (Left)  1     Rest tremor amplitude RUE 2     Rest tremor amplitude LUE 1  rare   Rest tremor amplitude RLE 0     Reset tremor amplitude LLE 0     Lip/Jaw Tremor  2     Consistency of tremor 3     Motor Exam Total:          Some dystonic vs dyskinetic movements of the right arm when walking  Review of Systems   Constitutional: Negative  Negative for appetite change and fever  HENT: Negative  Negative for hearing loss, tinnitus, trouble swallowing and voice change  Eyes: Negative  Negative for photophobia and pain  Respiratory: Negative  Negative for shortness of breath  Cardiovascular: Negative  Negative for palpitations  Gastrointestinal: Negative  Negative for nausea and vomiting  Upset stomach at times   Endocrine: Negative  Negative for cold intolerance and heat intolerance  Genitourinary: Negative  Negative for dysuria, frequency and urgency  Musculoskeletal: Negative  Negative for myalgias and neck pain  Skin: Negative  Negative for rash  Neurological: Positive for tremors  Negative for dizziness, seizures, syncope, facial asymmetry, speech difficulty, weakness, light-headedness, numbness and headaches  Hematological: Negative  Does not bruise/bleed easily  Psychiatric/Behavioral: Negative  Negative for confusion, hallucinations and sleep disturbance  The above ROS was reviewed and updated       Sophie Chaves MD  Medical Director   [facility]  Movement and Memory Specialist       Current Outpatient Medications on File Prior to Visit   Medication Sig Dispense Refill    carbidopa-levodopa (SINEMET CR)  mg TBCR per ER tablet Take 1 tablet by mouth daily at bedtime 30 tablet 6    carbidopa-levodopa (SINEMET)  mg per tablet Take 1 tablet by mouth 4 (four) times a day 360 tablet 5    cholecalciferol (VITAMIN D3) 1,000 units tablet Take 1,000 Units by mouth daily      mercaptopurine (PURINETHOL) 50 mg tablet Take 50 mg by mouth daily      Multiple Vitamin (MULTI-VITAMIN) tablet Take by mouth      POTASSIUM PO Take by mouth      ranitidine (ZANTAC) 150 mg tablet Take 150 mg by mouth as needed       sulfaSALAzine (AZULFIDINE) 500 mg tablet Take 500 mg by mouth daily 2 tabs 3 times daily      trospium (SANCTURA XR) 60 mg 24 hr capsule Take 60 mg by mouth daily before breakfast      zonisamide (ZONEGRAN) 100 mg capsule Take 1 capsule (100 mg total) by mouth daily 30 capsule 3    [DISCONTINUED] Amantadine HCl 100 MG tablet Take 100 mg by mouth 2 (two) times a day        No current facility-administered medications on file prior to visit

## 2020-01-24 NOTE — ASSESSMENT & PLAN NOTE
77year-old man with Parksinson's disease presents in followup  Increase in zonisamide resulted in resolution of his LE tremors  Overall he is stable from prior  We discussed different options regarding medication management and agreed not to make any changes at this point       In the future we can try increasing his sinemet dose, long-acting amantadine, Nourianz,

## 2020-02-24 DIAGNOSIS — G20 PARKINSON'S DISEASE (HCC): ICD-10-CM

## 2020-02-24 RX ORDER — CARBIDOPA AND LEVODOPA 25; 100 MG/1; MG/1
1 TABLET, EXTENDED RELEASE ORAL
Qty: 90 TABLET | Refills: 6 | Status: SHIPPED | OUTPATIENT
Start: 2020-02-24 | End: 2021-01-05 | Stop reason: SDUPTHER

## 2020-04-21 DIAGNOSIS — G20 PARKINSON'S DISEASE (HCC): ICD-10-CM

## 2020-04-21 RX ORDER — ZONISAMIDE 100 MG/1
100 CAPSULE ORAL DAILY
Qty: 90 CAPSULE | Refills: 3 | Status: SHIPPED | OUTPATIENT
Start: 2020-05-19 | End: 2021-01-05 | Stop reason: SDUPTHER

## 2020-05-08 DIAGNOSIS — G20 PARKINSON'S DISEASE (HCC): ICD-10-CM

## 2020-05-15 ENCOUNTER — TELEPHONE (OUTPATIENT)
Dept: NEUROLOGY | Facility: CLINIC | Age: 70
End: 2020-05-15

## 2020-06-16 ENCOUNTER — TELEPHONE (OUTPATIENT)
Dept: NEUROLOGY | Facility: CLINIC | Age: 70
End: 2020-06-16

## 2020-07-09 ENCOUNTER — OFFICE VISIT (OUTPATIENT)
Dept: NEUROLOGY | Facility: CLINIC | Age: 70
End: 2020-07-09
Payer: MEDICARE

## 2020-07-09 VITALS — SYSTOLIC BLOOD PRESSURE: 122 MMHG | DIASTOLIC BLOOD PRESSURE: 72 MMHG

## 2020-07-09 DIAGNOSIS — M47.12 OSTEOARTHRITIS OF CERVICAL SPINE WITH MYELOPATHY: ICD-10-CM

## 2020-07-09 DIAGNOSIS — G20 PARKINSON'S DISEASE (HCC): Primary | ICD-10-CM

## 2020-07-09 PROCEDURE — 99214 OFFICE O/P EST MOD 30 MIN: CPT | Performed by: PSYCHIATRY & NEUROLOGY

## 2020-07-09 RX ORDER — FOLIC ACID 1 MG/1
1 TABLET ORAL DAILY
COMMUNITY

## 2020-07-09 NOTE — ASSESSMENT & PLAN NOTE
79-year-old man presents in follow-up for Parkinson's disease  His motor symptoms remain very well controlled on his current medications  We discussed different options regarding medication management and agreed to keep things unchanged  Omega Sandi was discussed which the patient was interested in researching more  Sleep hygiene was encouraged    The patient appears to have a multifactorial gait disorder  He has scissoring gait and reports difficulty caring objects up and down stairs, when he can not visualize his feet  The symptoms likely reflect his cervical myelopathy  We reviewed this issue again today  He reports that he has been unable to acquire an updated MRI despite multiple attempts, for various reasons  Does follow with Orthopedic surgery and has discussed C-spine surgery in the past apparently was told he has bad candidate  He does continue to follow with orthopedic surgery also for his shoulder  He was not interested in exploring this further

## 2020-07-09 NOTE — PATIENT INSTRUCTIONS
The medication we talked about is "Nourianz"     Melatonin which can be increased to 15mg if needed

## 2020-07-09 NOTE — ASSESSMENT & PLAN NOTE
Unable to get an MRI - multiple prior attempts   Follows with orthopedics but was told he isnt a good candidate

## 2020-11-30 DIAGNOSIS — G20 PARKINSON'S DISEASE (HCC): ICD-10-CM

## 2020-11-30 RX ORDER — AMANTADINE HYDROCHLORIDE 100 MG/1
CAPSULE, GELATIN COATED ORAL
Qty: 180 CAPSULE | Refills: 3 | Status: SHIPPED | OUTPATIENT
Start: 2020-11-30 | End: 2021-01-05 | Stop reason: SDUPTHER

## 2021-01-05 ENCOUNTER — OFFICE VISIT (OUTPATIENT)
Dept: NEUROLOGY | Facility: CLINIC | Age: 71
End: 2021-01-05
Payer: MEDICARE

## 2021-01-05 VITALS
SYSTOLIC BLOOD PRESSURE: 90 MMHG | HEART RATE: 66 BPM | BODY MASS INDEX: 23.35 KG/M2 | DIASTOLIC BLOOD PRESSURE: 78 MMHG | WEIGHT: 166.8 LBS | HEIGHT: 71 IN

## 2021-01-05 DIAGNOSIS — G20 PARKINSON'S DISEASE (HCC): ICD-10-CM

## 2021-01-05 DIAGNOSIS — I95.1 ORTHOSTATIC HYPOTENSION: Primary | ICD-10-CM

## 2021-01-05 PROCEDURE — 99214 OFFICE O/P EST MOD 30 MIN: CPT | Performed by: PSYCHIATRY & NEUROLOGY

## 2021-01-05 RX ORDER — AMANTADINE HYDROCHLORIDE 100 MG/1
100 CAPSULE, GELATIN COATED ORAL 2 TIMES DAILY
Qty: 180 CAPSULE | Refills: 3 | Status: SHIPPED | OUTPATIENT
Start: 2021-01-05 | End: 2021-07-26 | Stop reason: SDUPTHER

## 2021-01-05 RX ORDER — ZONISAMIDE 100 MG/1
100 CAPSULE ORAL DAILY
Qty: 90 CAPSULE | Refills: 3 | Status: SHIPPED | OUTPATIENT
Start: 2021-01-05 | End: 2021-01-05

## 2021-01-05 RX ORDER — GLYCOPYRROLATE 1 MG/1
1 TABLET ORAL 3 TIMES DAILY
COMMUNITY

## 2021-01-05 RX ORDER — CARBIDOPA AND LEVODOPA 25; 100 MG/1; MG/1
1 TABLET, EXTENDED RELEASE ORAL
Qty: 90 TABLET | Refills: 3 | Status: SHIPPED | OUTPATIENT
Start: 2021-01-05 | End: 2021-07-26 | Stop reason: SDUPTHER

## 2021-01-05 RX ORDER — ZONISAMIDE 100 MG/1
100 CAPSULE ORAL DAILY
Qty: 90 CAPSULE | Refills: 3 | Status: SHIPPED | OUTPATIENT
Start: 2021-01-05 | End: 2021-07-26 | Stop reason: SDUPTHER

## 2021-01-05 NOTE — PATIENT INSTRUCTIONS
What is Orthostatic Hypotension? Orthostatic hypotension is an abrupt fall in blood pressure upon standing  Symptoms:  Many people experience lightheadedness, dizziness, blurred vision, unsteadiness, fatigue and/or pain/aching in the neck when standing  Some people may faint  The symptoms are due to the fall in blood pressure causing decreased blood flow to the brain  Managing your symptoms:  SAFETY FIRST:    If you feel dizzy, lightheaded, or faint, immediately sit or lie down  INCREASING THE FLUID IN YOUR CIRCULATION:   · Increasing the volume of fluid in your blood vessels helps to increase your blood pressure and may help to reduce symptoms  · Drink plenty of uncaffeinated fluids throughout the day  Start by drinking at least 16 oz of water when you wake up  Most people need 2-3 liters ( ounces or 8-12 cups) of fluid per day (1 liter= 4 cups or 34 ounces)  · If you are having symptoms, drink 1/2 Liter of non-caffeinated fluids (2 cups or 17 ounces) within 2 minutes  This may help to raise your blood pressure  This can be helpful first thing in the morning  · Make sure you have plenty of sodium in your diet  For many patients the goal is to have up to 3-4 grams (7960-8890 milligrams) of sodium in your diet  This can be accomplished by:  · Eating salty foods: For example, 1 cup of  regular canned tomato juice has 650 mg of sodium; 10 plain pretzels have 800 mg of sodium; and 1 packet of beef broth/bouillon has 1000 gm of sodium  The [facility] has a PDF describing the sodium content of many common foods  This can be accessed at:  http://ndb nal usda gov/ndb/foods   · Adding table salt to your food: Table salt is 40% sodium; 1 teaspoon of table salt contains 2,300 mg of sodium  · Salt tablets: These are sodium supplementation tablets for patients who are unable to achieve the supplemental sodium through diet alone   These are available over the counter and can often be found at sporting stores  · Thermotabs:  Each tablet contains 452 mg of sodium chloride (177 mg of sodium per tablet) as well as 15 mg of potassium chloride  · When you are in warm environments, be sure to drink extra fluids to avoid dehydration  EXERCISE:    Cardiovascular exercise can be an important part of toning your muscles and improving blood pressure  Start by doing exercises which do not require standing, such as a recumbent bike or rowing machine  Water jogging or water aerobics may also be helpful  Start with exercise as tolerated and gradually increase the duration and frequency of your work-outs  OTHER HELPFUL STRATEGIES:    Avoid eating large meals  Eating large meals can cause blood to collect in the gut  This may make some people more lightheaded  Therefore eating frequent small meals may help to avoid this problem and will still allow you to have the same caloric intake   Compression stockings, also called Jobst stockings, can be helpful in raising the blood pressure  These must be at least thigh high  Abdominal binders, also called corsets, are also available and can be used in conjunction with the compression stockings   Raise the head of your bed by 6 inches  Keeping the head of the bed elevated will allow you keep more fluid in your body and decreased some of the morning lightheadedness  This can be done by putting, bricks or cinder blocks under the head end of the bed  You can also get bed risers or full length foam wedges  Sleeping with pillows will not have the same effect   Remember that drinking alcohol can make your symptoms worse  MONITOR YOUR BLOOD PRESSURE AT HOME:    Keep a diary of your blood pressures at the following times: 1  At bedtime after laying down for 15 minutes  2  Before getting out of bed in the morning  3  After 3 min of standing after arising from bed in the morning  4  When you feel symptomatic

## 2021-01-05 NOTE — ASSESSMENT & PLAN NOTE
By symptom and systolic blood pressure  We discussed conservative management orthostatic hypotension

## 2021-01-05 NOTE — PROGRESS NOTES
Assessment/Plan:    Parkinson's disease [facility])  67-year-old man presents in follow-up for Parkinson's disease  Overall the patient's Parkinson's symptoms remain well controlled on the current combination medications  Does have some abnormal movements on the right to which appear more consistent with dyskinesia or mild dystonia then tremor  He also has bilateral lower extremity edema which is presumably a side effect of his amantadine  We discussed different options regarding medication management and agreed not to make any changes for the time being  Follow-up with Dr Cathy Granado in 6 months  Orthostatic hypotension    By symptom and systolic blood pressure  We discussed conservative management orthostatic hypotension  Diagnoses and all orders for this visit:    Orthostatic hypotension    Parkinson's disease ([address] )  -     Discontinue: zonisamide (ZONEGRAN) 100 mg capsule; Take 1 capsule (100 mg total) by mouth daily  -     carbidopa-levodopa (SINEMET)  mg per tablet; Take 1 tablet by mouth 4 (four) times a day  -     carbidopa-levodopa (SINEMET CR)  mg TBCR per ER tablet; Take 1 tablet by mouth daily at bedtime  -     amantadine (SYMMETREL) 100 mg capsule; Take 1 capsule (100 mg total) by mouth 2 (two) times a day  -     zonisamide (ZONEGRAN) 100 mg capsule; Take 1 capsule (100 mg total) by mouth daily    Other orders  -     glycopyrrolate (ROBINUL) 1 mg tablet; Take 1 mg by mouth 3 (three) times a day        Subjective:     Patient ID: José Miguel Tapia 72 y o  male    I had the pleasure of seeing your [facility] at [facility]        To review, José Miguel Tapia is a 67-year-old left-handed man who presents in follow up for Parkinson's disease with symptom onset around 2013 (62yo) with right hand tremor and right shoulder pain   The patient's course is complicated by motor fluctuations with wearing off and dyskinesias; he also has considerable right shoulder pain  He found good tremor reduction with the addition of amantadine but this resulted in significant ankle swelling as a side effect  Swelling improved some with reduction of dose from QID (started by Dr Bennett Isaacs) to TID and resolved when reduced to BID  Significant further reduction in tremor after the addition of zonisamide      Current medications and timing:  - Sinemet 25/100; 1 tab; 4 times per day @ 8 11 3:30 7:30   - Sinemet 25/100 CR; 1 tab; QHS  - Amantadine 100mg, 1 tab, BID 8a 3:30p (ankle swelling, dry mouth)  - zonisamide 100mg QHS   - MMJ     Previous medication trials:   - Nupro patch: dizzy after one dose   - Mirapex: 2-3 doses caused leg swelling   - Azilect didn't want to try with his crohn's medications     Interval history:  Tremor remains well controlled  Some trouble pouring, mixture of mild action tremor and dexterity limitations   Under investigation with cardiology but patient isn't sure what he is being evaluated for  Orthostatic lightheaded symptoms   Had one fall  Slipped on steps   Takes him some time to wake up and get going  The following portions of the patient's history were reviewed and updated as appropriate: allergies, current medications, past family history, past medical history, past social history, past surgical history and problem list     Objective:  BP 90/78 (BP Location: Left arm, Patient Position: Standing, Cuff Size: Standard)   Pulse 66   Ht 5' 11" (1 803 m)   Wt 75 7 kg (166 lb 12 8 oz)   BMI 23 26 kg/m²     Physical Exam    Neurological Exam    GENERAL MEDICAL EXAMINATION:  General appearance: alert, in no apparent distress  Appropriately dressed and groomed  Conversing and interacting appropriately  Eyes: Sclera are non-injected  Ears, nose, Mouth, Throat: Mucous membranes are moist    Resp: Breathing comfortably on RA   Musculoskeletal: No evidence of deformities  No contractures  No Edema  Skin: No visible rashes   Warm and well perfused  Psych: anxious appearing     Mental Status:  Able to relate history without difficulty  Attentive to conversation  Language is fluent and appropriate with normal prosody  There were no paraphasic errors  Speech was not dysarthric  Able to follow both midline and appendicular commands  General Neurology examination:     Mild pallilalia     UPDRS motor:                              Time since last dose:  2     Speech  1     Facial Expression  1     Rigidity - Neck  0     Rigidity - Upper Extremity (Right)  1     Rigidity - Upper Extremity (Left)   1     Rigidity - Lower Extremity (Right)  1     Rigidity - Lower Extremity (Left)   1     Finger Taps (Right)   2     Finger Taps (Left)   2     Hand Movement (Right)  2     Hand Movement (Left)   2     Pronation/Supination (Right)  2     Pronation/Supination (Left)   2     Toe Tapping (Right) 2     Toe Tapping (Left) 2     Leg Agility (Right)  2     Leg Agility (Left)   2     Arising from Chair   1     Gait   2     Freezing of Gait 0     Postural Stability        Posture 3     Global spontaneity of movement 0     Postural Tremor (Right) 1     Postural Tremor (Left) 1     Kinetic Tremor (Right)  0     Kinetic Tremor (Left)  0     Rest tremor amplitude RUE 1     Rest tremor amplitude LUE 0     Rest tremor amplitude RLE 1     Reset tremor amplitude LLE 0     Lip/Jaw Tremor  0     Consistency of tremor 1     Motor Exam Total:          Dysmetria: none on FNF  Dyskinesia: mild in the right arm  Dystonia: mild in the right arm/hand  ? Shoulder/neck  Myoclonus: rare in the bl arms  Review of Systems   Constitutional: Negative  Negative for appetite change and fever  HENT: Negative  Negative for hearing loss, tinnitus, trouble swallowing and voice change  Eyes: Negative  Negative for photophobia and pain  Respiratory: Negative  Negative for shortness of breath  Cardiovascular: Negative  Negative for palpitations     Gastrointestinal: Negative  Negative for nausea and vomiting  Endocrine: Negative  Negative for cold intolerance  Genitourinary: Negative  Negative for dysuria, frequency and urgency  Musculoskeletal: Negative  Negative for myalgias and neck pain  Skin: Negative  Negative for rash  Neurological: Positive for dizziness, weakness and light-headedness  Negative for tremors, seizures, syncope, facial asymmetry, speech difficulty, numbness and headaches  Hematological: Negative  Does not bruise/bleed easily  Psychiatric/Behavioral: Negative  Negative for confusion, hallucinations and sleep disturbance  All other systems reviewed and are negative  The above ROS was reviewed and updated       Current Outpatient Medications on File Prior to Visit   Medication Sig Dispense Refill    cholecalciferol (VITAMIN D3) 1,000 units tablet Take 1,000 Units by mouth daily      folic acid (FOLVITE) 1 mg tablet Take 1 mg by mouth daily      glycopyrrolate (ROBINUL) 1 mg tablet Take 1 mg by mouth 3 (three) times a day      mercaptopurine (PURINETHOL) 50 mg tablet Take 50 mg by mouth daily      Multiple Vitamin (MULTI-VITAMIN) tablet Take by mouth      POTASSIUM PO Take by mouth      sulfaSALAzine (AZULFIDINE) 500 mg tablet Take 500 mg by mouth daily 2 tabs 3 times daily      trospium (SANCTURA XR) 60 mg 24 hr capsule Take 60 mg by mouth daily before breakfast      [DISCONTINUED] amantadine (SYMMETREL) 100 mg capsule TAKE 1 CAPSULE BY MOUTH TWO TIMES DAILY 180 capsule 3    [DISCONTINUED] carbidopa-levodopa (SINEMET CR)  mg TBCR per ER tablet Take 1 tablet by mouth daily at bedtime 90 tablet 6    [DISCONTINUED] carbidopa-levodopa (SINEMET)  mg per tablet TAKE 1 TABLET BY MOUTH 4  TIMES DAILY 360 tablet 5    [DISCONTINUED] zonisamide (ZONEGRAN) 100 mg capsule Take 1 capsule (100 mg total) by mouth daily 90 capsule 3    ranitidine (ZANTAC) 150 mg tablet Take 150 mg by mouth as needed        No current facility-administered medications on file prior to visit          Donavan Siu MD  Medical Director   [address]  [facility]

## 2021-01-05 NOTE — ASSESSMENT & PLAN NOTE
60-year-old man presents in follow-up for Parkinson's disease  Overall the patient's Parkinson's symptoms remain well controlled on the current combination medications  Does have some abnormal movements on the right to which appear more consistent with dyskinesia or mild dystonia then tremor  He also has bilateral lower extremity edema which is presumably a side effect of his amantadine  We discussed different options regarding medication management and agreed not to make any changes for the time being  Follow-up with Dr Kvng Peter in 6 months

## 2021-07-26 ENCOUNTER — OFFICE VISIT (OUTPATIENT)
Dept: NEUROLOGY | Facility: CLINIC | Age: 71
End: 2021-07-26
Payer: MEDICARE

## 2021-07-26 VITALS
DIASTOLIC BLOOD PRESSURE: 74 MMHG | HEART RATE: 67 BPM | SYSTOLIC BLOOD PRESSURE: 126 MMHG | BODY MASS INDEX: 22.54 KG/M2 | HEIGHT: 71 IN | WEIGHT: 161 LBS

## 2021-07-26 DIAGNOSIS — G20 PARKINSON'S DISEASE (HCC): ICD-10-CM

## 2021-07-26 DIAGNOSIS — I95.1 ORTHOSTATIC HYPOTENSION: Primary | ICD-10-CM

## 2021-07-26 PROCEDURE — 99214 OFFICE O/P EST MOD 30 MIN: CPT | Performed by: NURSE PRACTITIONER

## 2021-07-26 RX ORDER — CARBIDOPA AND LEVODOPA 25; 100 MG/1; MG/1
1 TABLET, EXTENDED RELEASE ORAL
Qty: 90 TABLET | Refills: 3 | Status: SHIPPED | OUTPATIENT
Start: 2021-07-26

## 2021-07-26 RX ORDER — ZONISAMIDE 100 MG/1
100 CAPSULE ORAL DAILY
Qty: 90 CAPSULE | Refills: 3 | Status: SHIPPED | OUTPATIENT
Start: 2021-07-26 | End: 2021-10-07 | Stop reason: SDUPTHER

## 2021-07-26 RX ORDER — METOPROLOL SUCCINATE 25 MG/1
TABLET, EXTENDED RELEASE ORAL
COMMUNITY
Start: 2021-07-01

## 2021-07-26 RX ORDER — AMANTADINE HYDROCHLORIDE 100 MG/1
100 CAPSULE, GELATIN COATED ORAL 2 TIMES DAILY
Qty: 180 CAPSULE | Refills: 3 | Status: SHIPPED | OUTPATIENT
Start: 2021-07-26

## 2021-07-26 NOTE — ASSESSMENT & PLAN NOTE
Overall the patient's Parkinson's symptoms have slightly progressed  He notes some wearing off in the early evening hours in which he notes some breakthrough tremor of his arms and legs  He does continue to have some abnormal movements on the right to which appear more consistent with dyskinesia or possible mild dystonia then tremor  He also has bilateral lower extremity edema which is presumably a side effect of his amantadine but this has been relatively stable and he is satisfied with this  He does note some increasing difficulty with his balance in which we discussed PT and/or possible use of assistive device such as cane or walker  He is agreeable to PT  As far as his evening symptoms-could be wearing off, we will try pushing back his late afternoon sinemet 30 mins and see how this works  If this is ineffective we could try increasing just this dose specifically to 1 5 tabs and leaving the remaining medications the same  He continues to not be interested in DBS at this time  He will trial melatonin for sleep  He will follow in 4 months, to contact our office sooner with any concerns

## 2021-07-26 NOTE — PATIENT INSTRUCTIONS
Move 330 pm dosing back a half hour to 3 pm  If this does not help with the early evening tremors would recommend increase sinemet to 1 5 tabs at this time and leaving all other doses the same  Consider adding melatonin 3-6 mg at bedtime

## 2021-07-26 NOTE — PROGRESS NOTES
Patient ID: Carmita Johnson is a 72 y o  male  Assessment/Plan:    Parkinson's disease ([facility])  Overall the patient's Parkinson's symptoms have slightly progressed  He notes some wearing off in the early evening hours in which he notes some breakthrough tremor of his arms and legs  He does continue to have some abnormal movements on the right to which appear more consistent with dyskinesia or possible mild dystonia then tremor  He also has bilateral lower extremity edema which is presumably a side effect of his amantadine but this has been relatively stable and he is satisfied with this  He does note some increasing difficulty with his balance in which we discussed PT and/or possible use of assistive device such as cane or walker  He is agreeable to PT  As far as his evening symptoms-could be wearing off, we will try pushing back his late afternoon sinemet 30 mins and see how this works  If this is ineffective we could try increasing just this dose specifically to 1 5 tabs and leaving the remaining medications the same  He continues to not be interested in DBS at this time  He will trial melatonin for sleep  He will follow in 4 months, to contact our office sooner with any concerns  Subjective:    HPI    Carmita Johnson is a 72year-old left-handed man who presents in follow up for Parkinson's disease with symptom onset around 2013 (64yo) with right hand tremor and right shoulder pain  The patient's course is complicated by motor fluctuations with wearing off and dyskinesias; he also has considerable right shoulder pain  He found good tremor reduction with the addition of amantadine but this resulted in significant ankle swelling as a side effect  Swelling improved some with reduction of dose from QID (started by Dr Deondre Karimi) to TID and resolved when reduced to BID  Significant further reduction in tremor after the addition of zonisamide    He was last evaluated by our office in 1/2021 in which there were some abnormal movements noted that appeared to be more consistent with possible dyskinesias or mild dystonia  He continued with some mild LE edema likely from amantidine  He did have some orthostatic hypotension complaints which conservative measures were discussed  Current medications and timing:  - Sinemet 25/100; 1 tab; 4 times per day @ 8 11 3:30 7:30   - Sinemet 25/100 CR; 1 tab; QHS  - Amantadine 100mg, 1 tab, BID 8a 3:30p (ankle swelling, dry mouth)  - zonisamide 100mg QHS   - MMJ-for sleep     Previous medication trials:   - Nupro patch: dizzy after one dose   - Mirapex: 2-3 doses caused leg swelling   - Azilect didn't want to try with his crohn's medications     Interval history:  Swelling is about the same  Tremor fluctuates  He feels it is better in the morning, around 4pm on he feels his tremor is worse, full body will shake for about 10-15 mins  He has a bad shoulder in which he needs surgery but per patient he was told he was not a good surgical candidate  He feels his balance is not good because of his shoulder  He does not use a walker or a cane, he "goes slow" No recent falls  No freezing  He always feels stiff, but this improves throughout the day  Worse when he first gets up  He has done PT in the past which he did not have a great experience, however his wife states she did notice an improvement in his movement  He notes his symptoms worsen right when he is due for his med  He has trouble eating, more so holding the utensils, no trouble swallowing  Hard to write  He is able to perform ADLs independently, he does have a tough time with things like buttons  He drools when he sleeps, he has trouble fall asleep, but once he is asleep, he sleeps well, he rarely sleeps through the night  His wife states he talks in his sleep sometimes  No hallucinations  He gets dizzy when he stands too quickly-lasts for about a min   He does see a cardiologist    He does see a urologist  Objective:    Blood pressure 126/74, pulse 67, height 5' 11" (1 803 m), weight 73 kg (161 lb)  Physical Exam  Constitutional:       General: He is awake  HENT:      Right Ear: Hearing normal       Left Ear: Hearing normal    Eyes:      Extraocular Movements: Extraocular movements intact  Pupils: Pupils are equal, round, and reactive to light  Neurological:      Mental Status: He is alert  Neurological Exam  Mental Status  Awake and alert  Speech: hypophonia  Language is fluent with no aphasia  Cranial Nerves  CN III, IV, VI: Extraocular movements intact bilaterally  Pupils equal round and reactive to light bilaterally  CN V:  Right: Facial sensation is normal   Left: Facial sensation is normal on the left  CN VII:  Right: There is no facial weakness  Left: There is no facial weakness  CN VIII:  Right: Hearing is normal   Left: Hearing is normal   CN XI:  Right: Trapezius strength is normal   Left: Trapezius strength is normal   CN XII: Tongue midline without atrophy or fasciculations  Motor   Increased muscle tone  Strength: Strength 5/5 upper and lower extremities  Sensory  Light touch is normal in upper and lower extremities  Coordination  Right: Finger-to-nose abnormality: Rapid alternating movement abnormality:  Left: Finger-to-nose abnormality: Rapid alternating movement abnormality:  See UPDRS III    Gait  Casual gait: Able to rise from chair without using arms  Decreased arm swing L>R, slightly decreased stride length, stooped posture      UPDRS motor:    1/5/21 7/26/21                              Time since last dose:  2  3   Speech  1  1   Facial Expression  1  1   Rigidity - Neck  0  0   Rigidity - Upper Extremity (Right)  1  2   Rigidity - Upper Extremity (Left)   1  1   Rigidity - Lower Extremity (Right)  1  1   Rigidity - Lower Extremity (Left)   1  1   Finger Taps (Right)   2  3   Finger Taps (Left)   2  2   Hand Movement (Right)  2  3   Hand Movement (Left) 2  2   Pronation/Supination (Right)  2  2   Pronation/Supination (Left)   2  2   Toe Tapping (Right) 2  2   Toe Tapping (Left) 2  2   Leg Agility (Right)  2  2   Leg Agility (Left)   2  2   Arising from Chair   1  1   Gait   2  2   Freezing of Gait 0  0   Postural Stability         Posture 3  3   Global spontaneity of movement 0  1   Postural Tremor (Right) 1  1   Postural Tremor (Left) 1  1   Kinetic Tremor (Right)  0  2   Kinetic Tremor (Left)  0  0   Rest tremor amplitude RUE 1  2   Rest tremor amplitude LUE 0  0   Rest tremor amplitude RLE 1  1   Reset tremor amplitude LLE 0  0   Lip/Jaw Tremor  0  0   Consistency of tremor 1  2   Motor Exam Total:        Mild dyskinesias noted in the right arm  I have personally reviewed the ROS performed by the MA   ROS:    Review of Systems   Constitutional: Negative  Negative for appetite change and fever  HENT: Negative  Negative for hearing loss, tinnitus, trouble swallowing and voice change  Eyes: Negative  Negative for photophobia and pain  Respiratory: Negative  Negative for shortness of breath  Cardiovascular: Negative  Negative for palpitations  Gastrointestinal: Negative  Negative for nausea and vomiting  Endocrine: Negative  Negative for cold intolerance  Genitourinary: Positive for frequency  Negative for dysuria and urgency  Musculoskeletal: Positive for gait problem  Negative for myalgias and neck pain  Balance is getting worse  Has difficulty getting up has to push off to raise   Skin: Negative  Negative for rash  Neurological: Positive for tremors  Negative for dizziness, seizures, syncope, facial asymmetry, speech difficulty, weakness, light-headedness, numbness and headaches  Patient cannot write or hold anything in his hand without spilling, can't write due to shaking    Hematological: Negative  Does not bruise/bleed easily  Psychiatric/Behavioral: Positive for sleep disturbance   Negative for confusion and hallucinations  Patient doesn't sleep well, cat naps

## 2021-09-16 DIAGNOSIS — G20 PARKINSON'S DISEASE (HCC): Primary | ICD-10-CM

## 2021-10-07 DIAGNOSIS — G20 PARKINSON'S DISEASE (HCC): ICD-10-CM

## 2021-10-08 RX ORDER — ZONISAMIDE 100 MG/1
100 CAPSULE ORAL DAILY
Qty: 90 CAPSULE | Refills: 3 | Status: SHIPPED | OUTPATIENT
Start: 2021-10-08

## 2021-10-28 ENCOUNTER — TELEPHONE (OUTPATIENT)
Dept: NEUROLOGY | Facility: CLINIC | Age: 71
End: 2021-10-28

## 2021-10-29 ENCOUNTER — TELEPHONE (OUTPATIENT)
Dept: NEUROLOGY | Facility: CLINIC | Age: 71
End: 2021-10-29

## 2021-11-08 ENCOUNTER — TELEPHONE (OUTPATIENT)
Dept: NEUROLOGY | Facility: CLINIC | Age: 71
End: 2021-11-08

## 2021-11-08 NOTE — TELEPHONE ENCOUNTER
Pt. Called in stating her fell and went to the ED on Friday at [facility].    Pt said his balance has been off for a few weeks now, and that's what caused his fall this time. He states this is a new symptom for him.    He was told by [facility] that his balance is definitely off.     He said he was given a CT scan in the ED. He does have a closed fracture. Has a consult  with Surgery scheduled.    Pt. Stated if you wanted to order any MRI, he can't do closed. If you required one of him he would need IV sedation.  Or an Open MRI      Please advise.  Thank you.

## 2021-11-08 NOTE — TELEPHONE ENCOUNTER
Spoke with patient:  He had a fall on Friday in the parking lot of restaurant. This fall he tripped over his feet. He has a new feeling that he will fall backwards.   He is going to PT and found that his balance is getting worse. Is having a tough time doing stairs. Has a hard time lifting his leg. He does not use a cane or walker and they have not made any suggestions of this. This has been progressively worsening since the last office visit. He is shuffling more. Tends to be worse in the evening. Had moved his 330pm sinemet dose to 230 pm  No vertigo sensation or dizziness, his ears feel blocked.       Will have him continue PT-discuss benefit of cane or walker with them. Will increase 230 pm sinemet to 1.5 tabs, all other meds remain the same, will call us for update in 1 week. He does have a follow up appt scheduled with me at the end of the month.

## 2021-11-29 ENCOUNTER — OFFICE VISIT (OUTPATIENT)
Dept: NEUROLOGY | Facility: CLINIC | Age: 71
End: 2021-11-29
Payer: MEDICARE

## 2021-11-29 VITALS
SYSTOLIC BLOOD PRESSURE: 120 MMHG | DIASTOLIC BLOOD PRESSURE: 72 MMHG | TEMPERATURE: 96.7 F | BODY MASS INDEX: 20.86 KG/M2 | WEIGHT: 149 LBS | HEART RATE: 70 BPM | HEIGHT: 71 IN

## 2021-11-29 DIAGNOSIS — M47.12 OSTEOARTHRITIS OF CERVICAL SPINE WITH MYELOPATHY: ICD-10-CM

## 2021-11-29 DIAGNOSIS — I95.1 ORTHOSTATIC HYPOTENSION: ICD-10-CM

## 2021-11-29 DIAGNOSIS — G20 PARKINSON'S DISEASE (HCC): Primary | ICD-10-CM

## 2021-11-29 PROCEDURE — 99214 OFFICE O/P EST MOD 30 MIN: CPT | Performed by: NURSE PRACTITIONER

## 2021-12-01 ENCOUNTER — TELEPHONE (OUTPATIENT)
Dept: NEUROLOGY | Facility: CLINIC | Age: 71
End: 2021-12-01

## 2021-12-13 ENCOUNTER — TELEPHONE (OUTPATIENT)
Dept: NEUROLOGY | Facility: CLINIC | Age: 71
End: 2021-12-13

## 2021-12-13 DIAGNOSIS — G20 PARKINSON'S DISEASE (HCC): Primary | ICD-10-CM

## 2021-12-13 DIAGNOSIS — R47.89 GARBLED SPEECH: ICD-10-CM

## 2021-12-13 RX ORDER — RASAGILINE 1 MG/1
TABLET ORAL
Qty: 30 TABLET | Refills: 5 | Status: SHIPPED | OUTPATIENT
Start: 2021-12-13

## 2021-12-15 ENCOUNTER — TELEPHONE (OUTPATIENT)
Dept: NEUROLOGY | Facility: CLINIC | Age: 71
End: 2021-12-15

## 2021-12-21 ENCOUNTER — TELEPHONE (OUTPATIENT)
Dept: NEUROLOGY | Facility: CLINIC | Age: 71
End: 2021-12-21